# Patient Record
Sex: MALE | Race: WHITE | Employment: STUDENT | URBAN - METROPOLITAN AREA
[De-identification: names, ages, dates, MRNs, and addresses within clinical notes are randomized per-mention and may not be internally consistent; named-entity substitution may affect disease eponyms.]

---

## 2021-09-19 PROCEDURE — 99285 EMERGENCY DEPT VISIT HI MDM: CPT | Mod: 25

## 2021-09-19 PROCEDURE — 93005 ELECTROCARDIOGRAM TRACING: CPT

## 2021-09-20 ENCOUNTER — APPOINTMENT (OUTPATIENT)
Dept: ULTRASOUND IMAGING | Facility: CLINIC | Age: 21
End: 2021-09-20
Attending: EMERGENCY MEDICINE

## 2021-09-20 ENCOUNTER — HOSPITAL ENCOUNTER (EMERGENCY)
Facility: CLINIC | Age: 21
Discharge: HOME | End: 2021-09-20
Attending: EMERGENCY MEDICINE | Admitting: EMERGENCY MEDICINE

## 2021-09-20 VITALS
BODY MASS INDEX: 37.19 KG/M2 | DIASTOLIC BLOOD PRESSURE: 92 MMHG | WEIGHT: 315 LBS | TEMPERATURE: 97 F | SYSTOLIC BLOOD PRESSURE: 134 MMHG | OXYGEN SATURATION: 96 % | HEIGHT: 77 IN | HEART RATE: 81 BPM | RESPIRATION RATE: 18 BRPM

## 2021-09-20 DIAGNOSIS — R10.13 ABDOMINAL PAIN, EPIGASTRIC: ICD-10-CM

## 2021-09-20 LAB
ALBUMIN SERPL-MCNC: 3.8 G/DL (ref 3.4–5)
ALBUMIN UR-MCNC: 10 MG/DL
ALP SERPL-CCNC: 79 U/L (ref 40–150)
ALT SERPL W P-5'-P-CCNC: 81 U/L (ref 0–70)
ANION GAP SERPL CALCULATED.3IONS-SCNC: 7 MMOL/L (ref 3–14)
APPEARANCE UR: CLEAR
AST SERPL W P-5'-P-CCNC: 31 U/L (ref 0–45)
ATRIAL RATE - MUSE: 73 BPM
BASOPHILS # BLD AUTO: 0.1 10E3/UL (ref 0–0.2)
BASOPHILS NFR BLD AUTO: 1 %
BILIRUB SERPL-MCNC: 0.5 MG/DL (ref 0.2–1.3)
BILIRUB UR QL STRIP: NEGATIVE
BUN SERPL-MCNC: 15 MG/DL (ref 7–30)
CALCIUM SERPL-MCNC: 9.1 MG/DL (ref 8.5–10.1)
CHLORIDE BLD-SCNC: 109 MMOL/L (ref 94–109)
CO2 SERPL-SCNC: 23 MMOL/L (ref 20–32)
COLOR UR AUTO: YELLOW
CREAT SERPL-MCNC: 1.07 MG/DL (ref 0.66–1.25)
DIASTOLIC BLOOD PRESSURE - MUSE: NORMAL MMHG
EOSINOPHIL # BLD AUTO: 0.1 10E3/UL (ref 0–0.7)
EOSINOPHIL NFR BLD AUTO: 1 %
ERYTHROCYTE [DISTWIDTH] IN BLOOD BY AUTOMATED COUNT: 12.9 % (ref 10–15)
GFR SERPL CREATININE-BSD FRML MDRD: >90 ML/MIN/1.73M2
GLUCOSE BLD-MCNC: 106 MG/DL (ref 70–99)
GLUCOSE UR STRIP-MCNC: NEGATIVE MG/DL
HCT VFR BLD AUTO: 46.1 % (ref 40–53)
HGB BLD-MCNC: 15.3 G/DL (ref 13.3–17.7)
HGB UR QL STRIP: NEGATIVE
IMM GRANULOCYTES # BLD: 0.1 10E3/UL
IMM GRANULOCYTES NFR BLD: 1 %
INTERPRETATION ECG - MUSE: NORMAL
KETONES UR STRIP-MCNC: NEGATIVE MG/DL
LEUKOCYTE ESTERASE UR QL STRIP: NEGATIVE
LIPASE SERPL-CCNC: 127 U/L (ref 73–393)
LYMPHOCYTES # BLD AUTO: 4.1 10E3/UL (ref 0.8–5.3)
LYMPHOCYTES NFR BLD AUTO: 38 %
MCH RBC QN AUTO: 29.1 PG (ref 26.5–33)
MCHC RBC AUTO-ENTMCNC: 33.2 G/DL (ref 31.5–36.5)
MCV RBC AUTO: 88 FL (ref 78–100)
MONOCYTES # BLD AUTO: 0.8 10E3/UL (ref 0–1.3)
MONOCYTES NFR BLD AUTO: 7 %
MUCOUS THREADS #/AREA URNS LPF: PRESENT /LPF
NEUTROPHILS # BLD AUTO: 5.7 10E3/UL (ref 1.6–8.3)
NEUTROPHILS NFR BLD AUTO: 52 %
NITRATE UR QL: NEGATIVE
NRBC # BLD AUTO: 0 10E3/UL
NRBC BLD AUTO-RTO: 0 /100
P AXIS - MUSE: 23 DEGREES
PH UR STRIP: 5.5 [PH] (ref 5–7)
PLATELET # BLD AUTO: 259 10E3/UL (ref 150–450)
POTASSIUM BLD-SCNC: 3.5 MMOL/L (ref 3.4–5.3)
PR INTERVAL - MUSE: 150 MS
PROT SERPL-MCNC: 7.9 G/DL (ref 6.8–8.8)
QRS DURATION - MUSE: 94 MS
QT - MUSE: 388 MS
QTC - MUSE: 427 MS
R AXIS - MUSE: -20 DEGREES
RBC # BLD AUTO: 5.26 10E6/UL (ref 4.4–5.9)
RBC URINE: 0 /HPF
SODIUM SERPL-SCNC: 139 MMOL/L (ref 133–144)
SP GR UR STRIP: 1.03 (ref 1–1.03)
SYSTOLIC BLOOD PRESSURE - MUSE: NORMAL MMHG
T AXIS - MUSE: 3 DEGREES
TROPONIN I SERPL-MCNC: <0.015 UG/L (ref 0–0.04)
UROBILINOGEN UR STRIP-MCNC: 2 MG/DL
VENTRICULAR RATE- MUSE: 73 BPM
WBC # BLD AUTO: 10.8 10E3/UL (ref 4–11)
WBC URINE: 3 /HPF

## 2021-09-20 PROCEDURE — 250N000011 HC RX IP 250 OP 636: Performed by: EMERGENCY MEDICINE

## 2021-09-20 PROCEDURE — 96361 HYDRATE IV INFUSION ADD-ON: CPT

## 2021-09-20 PROCEDURE — 96374 THER/PROPH/DIAG INJ IV PUSH: CPT

## 2021-09-20 PROCEDURE — 258N000003 HC RX IP 258 OP 636: Performed by: EMERGENCY MEDICINE

## 2021-09-20 PROCEDURE — 36415 COLL VENOUS BLD VENIPUNCTURE: CPT | Performed by: EMERGENCY MEDICINE

## 2021-09-20 PROCEDURE — 250N000009 HC RX 250: Performed by: EMERGENCY MEDICINE

## 2021-09-20 PROCEDURE — 82040 ASSAY OF SERUM ALBUMIN: CPT | Performed by: EMERGENCY MEDICINE

## 2021-09-20 PROCEDURE — 96375 TX/PRO/DX INJ NEW DRUG ADDON: CPT

## 2021-09-20 PROCEDURE — 76705 ECHO EXAM OF ABDOMEN: CPT

## 2021-09-20 PROCEDURE — 84484 ASSAY OF TROPONIN QUANT: CPT | Performed by: EMERGENCY MEDICINE

## 2021-09-20 PROCEDURE — 85025 COMPLETE CBC W/AUTO DIFF WBC: CPT | Performed by: EMERGENCY MEDICINE

## 2021-09-20 PROCEDURE — 83690 ASSAY OF LIPASE: CPT | Performed by: EMERGENCY MEDICINE

## 2021-09-20 PROCEDURE — 250N000013 HC RX MED GY IP 250 OP 250 PS 637: Performed by: EMERGENCY MEDICINE

## 2021-09-20 PROCEDURE — 81001 URINALYSIS AUTO W/SCOPE: CPT | Performed by: EMERGENCY MEDICINE

## 2021-09-20 RX ORDER — ONDANSETRON 2 MG/ML
4 INJECTION INTRAMUSCULAR; INTRAVENOUS ONCE
Status: COMPLETED | OUTPATIENT
Start: 2021-09-20 | End: 2021-09-20

## 2021-09-20 RX ORDER — KETOROLAC TROMETHAMINE 15 MG/ML
15 INJECTION, SOLUTION INTRAMUSCULAR; INTRAVENOUS ONCE
Status: COMPLETED | OUTPATIENT
Start: 2021-09-20 | End: 2021-09-20

## 2021-09-20 RX ADMIN — LIDOCAINE HYDROCHLORIDE 30 ML: 20 SOLUTION ORAL; TOPICAL at 01:09

## 2021-09-20 RX ADMIN — SODIUM CHLORIDE 1000 ML: 9 INJECTION, SOLUTION INTRAVENOUS at 00:45

## 2021-09-20 RX ADMIN — ONDANSETRON 4 MG: 2 INJECTION INTRAMUSCULAR; INTRAVENOUS at 00:45

## 2021-09-20 RX ADMIN — KETOROLAC TROMETHAMINE 15 MG: 15 INJECTION, SOLUTION INTRAMUSCULAR; INTRAVENOUS at 03:34

## 2021-09-20 ASSESSMENT — ENCOUNTER SYMPTOMS
DIZZINESS: 1
DIARRHEA: 0
VOMITING: 0
NAUSEA: 1
ABDOMINAL PAIN: 1

## 2021-09-20 ASSESSMENT — MIFFLIN-ST. JEOR: SCORE: 2999.81

## 2021-09-20 NOTE — ED PROVIDER NOTES
"  History   Chief Complaint:  Abdominal Pain (Periumbilical pain for 45 minutes, +nausea, no vomit. )       RUBY Tena is a 21 year old male with history of obesity, ADD, and tobacco use disorder who presents with abdominal pain. The patient states that he has had abdominal pain for the past 1.5 hours with nausea, dizziness.Pain started in epigastric area and felt nausea. [place] like he was going to pass out. Felt dizzy. Denies vomiting and diarrhea. He ate dinner at about 1930 this evening. Here in the ED, he feels less nauseated than earlier. Has history of acid reflux, eats a lot of spicy foods. Sometimes will have acid reflux feeling when lying down at night. No urinary symptoms, fever. No chest pain or shortness of breath.    Review of Systems   Eyes: Positive for visual disturbance.   Gastrointestinal: Positive for abdominal pain and nausea. Negative for diarrhea and vomiting.   Neurological: Positive for dizziness.   All other systems reviewed and are negative.    Allergies:  Cats    Medications:  The patient is currently on no regular medications.    Past Medical History:    Behavior disturbance  Obesity  ADD  Tobacco use disorder  Sleep apnea    Past Surgical History:    Tonsillectomy  Adenoidectomy  Uvelectomy    Family History:    Father: ADD/ADHD, Anxiety  Mother: ADD/ADHD, Anxiety    Social History:  Presents with mother and sister    Physical Exam     Patient Vitals for the past 24 hrs:   BP Temp Temp src Pulse Resp SpO2 Height Weight   09/20/21 0455 -- -- -- -- -- 96 % -- --   09/20/21 0335 -- -- -- 81 -- 98 % -- --   09/20/21 0330 (!) 134/92 -- -- -- -- -- -- --   09/20/21 0004 (!) 160/73 97  F (36.1  C) Temporal 67 18 99 % 1.956 m (6' 5\") (!) 188.2 kg (415 lb)       Physical Exam    General: Sitting up in bed, does not appear in distress  Eyes:  The pupils are equal and round    Conjunctivae and sclerae are normal  ENT:    Wearing a mask  Neck:  Normal range of motion  CV:  Regular rate, " regular rhythm     Skin warm and well perfused   Resp:  Non labored breathing on room air    No tachypnea    No cough heard    Lungs clear bilaterally  GI:  Abdomen is soft, there is no rigidity    No distension    No rebound tenderness     Minimal epigastric tenderness  MS:  Normal muscular tone  Skin:  No rash or acute skin lesions noted  Neuro:   Awake, alert.      Speech is normal and fluent.    Face is symmetric.     Moves all extremities equally  Psych: Normal affect.  Appropriate interactions.    Emergency Department Course   ECG  ECG taken at 0112, ECG read at 0119  Normal sinus rhythm with sinus arrhythmia  Cannot rule out anterior infarct, age undetermined  Abnormal ECG  Rate 73 bpm. KS interval 150 ms. QRS duration 94 ms. QT/QTc 388/427 ms. P-R-T axes 23 -20 3.     Imaging:  Abdomen US, limited (RUQ only)  1. Unremarkable appearance of the gallbladder.  2. No biliary dilatation.  3. Marked diffuse fatty infiltration of the liver.    As per Radiology    Laboratory:  UA with microscopic: Protein albumin: 10 (A) Mucus: present (A) o/w WNL     CBC: WBC 10.8, HGB 15.3,     CMP: Glucose: 106 (H), ALT: 81 (H) o/w WNL (Creatinine 1.07)    Lipase: 127    Troponin (Collected 0044): <0.015    Emergency Department Course:    Reviewed:  I reviewed nursing notes, vitals, past medical history and care everywhere    Assessments:  0058 I obtained history and examined the patient as noted above.    0250 I rechecked the patient and explained findings. Now says pain is localized in RUQ. GI cocktail helped pain then it came back    0449 I rechecked the patient. He reports no pain.    Interventions:  0045 0.9% sodium chloride 1000 mL IV    0045 Ondansetron 4 mg IV    0109 GI Cocktail 30 mL PO    0334 Toradol 15 mg IV    Disposition:  The patient was discharged to home.       Impression & Plan     Medical Decision Making:  Fuad Tena is a 21 year old male who presented to the ED with abdominal pain. Points to  epigastric area as to location of his pain. Minimal tenderness in this area. Labs unremarkable. GI cocktail improved symptoms then pain came back. Looked comfortable in ED. Troponin negative and doubt ACS. On recheck says pain is now in RUQ and minimal tenderness in this area. US gallbladder showed no stones. Has fatty liver. Discussed these with patient. On recheck, patient now says his pain is gone. Suspect that pain more likely from gastritis/PUD/reflux. Does eat spicy food, has had reflux symptoms. Discussed diet changes, PPI use. Doubt appendicitis with no pain on RLQ, no vomiting, no fever and pain resolved. Doubt PE with no shortness of breath, no hypoxia, no tachycardia, no shortness of breath. Patient feeling improved so don't think CT abdomen is indicated at this time. Discussed reasons to return to ED including pain moving to RLQ, fever, vomiting, etc. Follow-up with PCP.    Diagnosis:    ICD-10-CM    1. Abdominal pain, epigastric  R10.13      Scribe Disclosure:  I, Donny Osei, am serving as a scribe at 12:57 AM on 9/20/2021 to document services personally performed by Halima Venegas MD based on my observations and the provider's statements to me.            Halima Venegas MD  09/20/21 [id number]

## 2021-09-20 NOTE — ED NOTES
Patient reports some slight improvement with GI cocktail for a few minutes but the pain is back to pre medication levels now.  MD notified.

## 2021-09-20 NOTE — DISCHARGE INSTRUCTIONS
Start antacid medication like omeprazole 20 mg daily  Try to cut back on spicy, acidic foods  Avoid eating right before bed and laying down  Tylenol is better for stomach than ibuprofen    Watch for fever, vomiting, pain moving to right lower quadrant as these can be signs of appendicitis  Follow up with primary care provider